# Patient Record
Sex: MALE | Race: WHITE | URBAN - METROPOLITAN AREA
[De-identification: names, ages, dates, MRNs, and addresses within clinical notes are randomized per-mention and may not be internally consistent; named-entity substitution may affect disease eponyms.]

---

## 2022-04-18 ENCOUNTER — APPOINTMENT (RX ONLY)
Dept: URBAN - METROPOLITAN AREA CLINIC 26 | Facility: CLINIC | Age: 62
Setting detail: DERMATOLOGY
End: 2022-04-18

## 2022-04-18 PROBLEM — C44.329 SQUAMOUS CELL CARCINOMA OF SKIN OF OTHER PARTS OF FACE: Status: ACTIVE | Noted: 2022-04-18

## 2022-04-18 PROCEDURE — ? MOHS SURGERY

## 2022-04-18 PROCEDURE — 13132 CMPLX RPR F/C/C/M/N/AX/G/H/F: CPT

## 2022-04-18 PROCEDURE — 17311 MOHS 1 STAGE H/N/HF/G: CPT

## 2022-04-18 NOTE — PROCEDURE: MOHS SURGERY
February 20, 2017    Randa Devine  [id number]  [place] [address]             [facility] [address] [place] [address] Dear Ms Devine,     Thank you for your time in getting admitted to Outpatient Case Management. I have included some educational brochures in this mail out along with my business card.    Please review the enclosed literature and call me if you have any questions.     Thanks again for your time and cooperation,    Ina Lovell RN  Outpatient Case Management  [phone]  Ext [phone]  [email or web address]        Localized Dermabrasion Text: The patient was draped in routine manner.  Localized dermabrasion using 3 x 17 mm wire brush was performed in routine manner to papillary dermis. This spot dermabrasion is being performed to complete skin cancer reconstruction. It also will eliminate the other sun damaged precancerous cells that are known to be part of the regional effect of a lifetime's worth of sun exposure. This localized dermabrasion is therapeutic and should not be considered cosmetic in any regard. Localized Dermabrasion With Wire Brush Text: The patient was draped in routine manner.  Localized dermabrasion using 3 x 17 mm wire brush was performed in routine manner to papillary dermis. This spot dermabrasion is being performed to complete skin cancer reconstruction. It also will eliminate the other sun damaged precancerous cells that are known to be part of the regional effect of a lifetime's worth of sun exposure. This localized dermabrasion is therapeutic and should not be considered cosmetic in any regard.

## 2022-04-18 NOTE — PROCEDURE: MOHS SURGERY
Statement Selected Modified Advancement Flap Text: The defect edges were debeveled with a #15 scalpel blade.  Given the location of the defect, shape of the defect and the proximity to free margins a three point advancement flap was deemed most appropriate.  Using a sterile surgical marker, an appropriate advancement flap was drawn incorporating the defect and placing the expected incisions within the relaxed skin tension lines where possible.   The area thus outlined was incised deep to adipose tissue with a #15 scalpel blade.  Three standing tissue cones were excised.  The skin margins were undermined to an appropriate distance in all directions utilizing iris scissors.

## 2022-04-18 NOTE — PROCEDURE: MOHS SURGERY
Cosigning all charting with Carl Salinas, Student Nurse, for all assessments and medication administration.    Scc Poorly Differentiated Histology Text: There are irregular masses of epidermal cells in the upper dermis.  Within the tumor masses, there are varying proportions of normal squamous cells and anaplastic squamous cells.  The anaplastic cells have variation in size and shape with hyperplasia and hyperchromasia of the nuclei, absence of intracellular bridges and varying degrees of keratinization.  A poorly differentiated pattern is noted.

## 2022-04-18 NOTE — PROCEDURE: MOHS SURGERY
redness, itchiness Purse String (Simple) Text: Given the location of the defect and the characteristics of the surrounding skin a purse string closure was deemed most appropriate.  Undermining was performed circumfirentially around the surgical defect.  A purse string suture was then placed and tightened.
